# Patient Record
Sex: MALE | Race: WHITE | NOT HISPANIC OR LATINO | Employment: FULL TIME | URBAN - METROPOLITAN AREA
[De-identification: names, ages, dates, MRNs, and addresses within clinical notes are randomized per-mention and may not be internally consistent; named-entity substitution may affect disease eponyms.]

---

## 2023-04-29 ENCOUNTER — HOSPITAL ENCOUNTER (EMERGENCY)
Facility: HOSPITAL | Age: 38
Discharge: HOME | End: 2023-04-30
Attending: EMERGENCY MEDICINE

## 2023-04-29 VITALS
WEIGHT: 257.94 LBS | HEART RATE: 87 BPM | RESPIRATION RATE: 18 BRPM | DIASTOLIC BLOOD PRESSURE: 65 MMHG | OXYGEN SATURATION: 96 % | TEMPERATURE: 98.3 F | SYSTOLIC BLOOD PRESSURE: 116 MMHG

## 2023-04-29 DIAGNOSIS — R56.9 SEIZURE (HCC): Primary | ICD-10-CM

## 2023-04-29 NOTE — Clinical Note
Mirela Rock was seen and treated in our emergency department on 4/29/2023  Diagnosis:     Ashley Dill  may return to work on return date  He may return on this date: 05/02/2023         If you have any questions or concerns, please don't hesitate to call        Sejal Trent MD    ______________________________           _______________          _______________  [facility] [place]                              Date                                Time

## 2023-04-30 LAB
ANION GAP SERPL CALCULATED.3IONS-SCNC: 11 MMOL/L (ref 4–13)
ATRIAL RATE: 76 BPM
BASOPHILS # BLD AUTO: 0.06 THOUSANDS/ΜL (ref 0–0.1)
BASOPHILS NFR BLD AUTO: 1 % (ref 0–1)
BUN SERPL-MCNC: 19 MG/DL (ref 5–25)
CALCIUM SERPL-MCNC: 9.4 MG/DL (ref 8.4–10.2)
CHLORIDE SERPL-SCNC: 105 MMOL/L (ref 96–108)
CO2 SERPL-SCNC: 21 MMOL/L (ref 21–32)
CREAT SERPL-MCNC: 1.16 MG/DL (ref 0.6–1.3)
EOSINOPHIL # BLD AUTO: 0.09 THOUSAND/ΜL (ref 0–0.61)
EOSINOPHIL NFR BLD AUTO: 1 % (ref 0–6)
ERYTHROCYTE [DISTWIDTH] IN BLOOD BY AUTOMATED COUNT: 12.9 % (ref 11.6–15.1)
GFR SERPL CREATININE-BSD FRML MDRD: 78 ML/MIN/1.73SQ M
GLUCOSE SERPL-MCNC: 131 MG/DL (ref 65–140)
HCT VFR BLD AUTO: 41.3 % (ref 36.5–49.3)
HGB BLD-MCNC: 13.9 G/DL (ref 12–17)
IMM GRANULOCYTES # BLD AUTO: 0.11 THOUSAND/UL (ref 0–0.2)
IMM GRANULOCYTES NFR BLD AUTO: 1 % (ref 0–2)
LYMPHOCYTES # BLD AUTO: 2.08 THOUSANDS/ΜL (ref 0.6–4.47)
LYMPHOCYTES NFR BLD AUTO: 17 % (ref 14–44)
MAGNESIUM SERPL-MCNC: 2.2 MG/DL (ref 1.9–2.7)
MCH RBC QN AUTO: 30.8 PG (ref 26.8–34.3)
MCHC RBC AUTO-ENTMCNC: 33.7 G/DL (ref 31.4–37.4)
MCV RBC AUTO: 92 FL (ref 82–98)
MONOCYTES # BLD AUTO: 0.95 THOUSAND/ΜL (ref 0.17–1.22)
MONOCYTES NFR BLD AUTO: 8 % (ref 4–12)
NEUTROPHILS # BLD AUTO: 8.84 THOUSANDS/ΜL (ref 1.85–7.62)
NEUTS SEG NFR BLD AUTO: 72 % (ref 43–75)
NRBC BLD AUTO-RTO: 0 /100 WBCS
P AXIS: 69 DEGREES
PLATELET # BLD AUTO: 341 THOUSANDS/UL (ref 149–390)
PMV BLD AUTO: 9.1 FL (ref 8.9–12.7)
POTASSIUM SERPL-SCNC: 3.8 MMOL/L (ref 3.5–5.3)
PR INTERVAL: 148 MS
QRS AXIS: 94 DEGREES
QRSD INTERVAL: 104 MS
QT INTERVAL: 388 MS
QTC INTERVAL: 436 MS
RBC # BLD AUTO: 4.51 MILLION/UL (ref 3.88–5.62)
SODIUM SERPL-SCNC: 137 MMOL/L (ref 135–147)
T WAVE AXIS: 64 DEGREES
VENTRICULAR RATE: 76 BPM
WBC # BLD AUTO: 12.13 THOUSAND/UL (ref 4.31–10.16)

## 2023-04-30 RX ADMIN — LEVETIRACETAM 1000 MG: 100 INJECTION, SOLUTION INTRAVENOUS at 00:41

## 2023-04-30 NOTE — ED PROVIDER NOTES
History  Chief Complaint   Patient presents with    Syncope     Patient presents for syncopal episode and associated seizure that happened tonight at work  Patient fell forwards, bit his tongue, and had a seizure due to the fact that he has not taken his keppra recently  Patient states he has been working 24 hour shifts and has not had an opportunity to take his medication  HPI     Patient has a history of seizure disorder, typically takes Keppra 1000 mg twice daily  Patient states he has been working at his job since Friday morning at 0500 due to emergent calls, did not take his Keppra, suffered seizure similar to previous seizures  Patient states he has not slept since Friday morning at 0500  Denies any headache, chest pain, abdominal pain, nausea, vomiting, diarrhea  None       History reviewed  No pertinent past medical history  History reviewed  No pertinent surgical history  History reviewed  No pertinent family history  I have reviewed and agree with the history as documented  E-Cigarette/Vaping     E-Cigarette/Vaping Substances    THC Yes      Social History     Tobacco Use    Smoking status: Never    Smokeless tobacco: Never   Substance Use Topics    Alcohol use: Not Currently    Drug use: Yes     Types: Marijuana       Review of Systems   Neurological: Positive for seizures  All other systems reviewed and are negative  Physical Exam  Physical Exam  Vitals and nursing note reviewed  Constitutional:       General: He is not in acute distress  Appearance: He is well-developed  He is not diaphoretic  HENT:      Head: Normocephalic and atraumatic  Right Ear: External ear normal       Left Ear: External ear normal       Mouth/Throat:      Comments: Tongue biting  Eyes:      General:         Right eye: No discharge  Left eye: No discharge  Pupils: Pupils are equal, round, and reactive to light  Neck:      Thyroid: No thyromegaly        Trachea: No tracheal deviation  Cardiovascular:      Rate and Rhythm: Normal rate and regular rhythm  Heart sounds: No murmur heard  Pulmonary:      Effort: Pulmonary effort is normal       Breath sounds: Normal breath sounds  Abdominal:      General: Bowel sounds are normal  There is no distension  Palpations: Abdomen is soft  Tenderness: There is no abdominal tenderness  Musculoskeletal:         General: No deformity  Normal range of motion  Cervical back: Normal range of motion and neck supple  Comments: Moves all extremities spontaneously  No focal deficits  No cervical, thoracic, lumbar tenderness   Skin:     General: Skin is warm  Capillary Refill: Capillary refill takes less than 2 seconds  Neurological:      Mental Status: He is alert and oriented to person, place, and time  Cranial Nerves: No cranial nerve deficit  Motor: No abnormal muscle tone        Comments: GCS 15, alert and oriented, no focal neurologic deficits   Psychiatric:         Behavior: Behavior normal          Vital Signs  ED Triage Vitals [04/29/23 2351]   Temperature Pulse Respirations Blood Pressure SpO2   98 3 °F (36 8 °C) 87 18 116/65 96 %      Temp Source Heart Rate Source Patient Position - Orthostatic VS BP Location FiO2 (%)   Oral Monitor Lying Right arm --      Pain Score       --           Vitals:    04/29/23 2351   BP: 116/65   Pulse: 87   Patient Position - Orthostatic VS: Lying         Visual Acuity      ED Medications  Medications   levETIRAcetam (KEPPRA) 1,000 mg in sodium chloride 0 9 % 100 mL IVPB (1,000 mg Intravenous New Bag 4/30/23 0041)       Diagnostic Studies  Results Reviewed     Procedure Component Value Units Date/Time    Basic metabolic panel [[id number]] Collected: 04/30/23 0019    Lab Status: Final result Specimen: Blood from Arm, Left Updated: 04/30/23 0053     Sodium 137 mmol/L      Potassium 3 8 mmol/L      Chloride 105 mmol/L      CO2 21 mmol/L      ANION GAP 11 mmol/L BUN 19 mg/dL      Creatinine 1 16 mg/dL      Glucose 131 mg/dL      Calcium 9 4 mg/dL      eGFR 78 ml/min/1 73sq m     Narrative:      [place] guidelines for Chronic Kidney Disease (CKD):     Stage 1 with normal or high GFR (GFR > 90 mL/min/1 73 square meters)    Stage 2 Mild CKD (GFR = 60-89 mL/min/1 73 square meters)    Stage 3A Moderate CKD (GFR = 45-59 mL/min/1 73 square meters)    Stage 3B Moderate CKD (GFR = 30-44 mL/min/1 73 square meters)    Stage 4 Severe CKD (GFR = 15-29 mL/min/1 73 square meters)    Stage 5 End Stage CKD (GFR <15 mL/min/1 73 square meters)  Note: GFR calculation is accurate only with a steady state creatinine    Magnesium [[id number]]  (Normal) Collected: 04/30/23 0019    Lab Status: Final result Specimen: Blood from Arm, Left Updated: 04/30/23 0053     Magnesium 2 2 mg/dL     CBC and differential [[id number]]  (Abnormal) Collected: 04/30/23 0019    Lab Status: Final result Specimen: Blood from Arm, Left Updated: 04/30/23 0026     WBC 12 13 Thousand/uL      RBC 4 51 Million/uL      Hemoglobin 13 9 g/dL      Hematocrit 41 3 %      MCV 92 fL      MCH 30 8 pg      MCHC 33 7 g/dL      RDW 12 9 %      MPV 9 1 fL      Platelets 127 Thousands/uL      nRBC 0 /100 WBCs      Neutrophils Relative 72 %      Immat GRANS % 1 %      Lymphocytes Relative 17 %      Monocytes Relative 8 %      Eosinophils Relative 1 %      Basophils Relative 1 %      Neutrophils Absolute 8 84 Thousands/µL      Immature Grans Absolute 0 11 Thousand/uL      Lymphocytes Absolute 2 08 Thousands/µL      Monocytes Absolute 0 95 Thousand/µL      Eosinophils Absolute 0 09 Thousand/µL      Basophils Absolute 0 06 Thousands/µL     Levetiracetam level [[id number]] Collected: 04/30/23 0019    Lab Status:  In process Specimen: Blood from Arm, Left Updated: 04/30/23 0023                 No orders to display              Procedures  Procedures         ED Course  ED Course as of 04/30/23 0117   Sun Apr 30, 2023   0025 Patient alert and oriented, knows he is not able to drive for 6 months from today                                             Medical Decision Making  History of seizures, patient had breakthrough seizure, possibly related to sleep deprivation  Returned to his baseline  Nonfocal neurologic exam   Laboratory studies largely unremarkable, Keppra level ordered, not resulted, patient aware, will follow as an outpatient  Patient loaded with a dose of Keppra, observed in the emergency department, continue to be at his baseline, picked up by family, patient aware that he is unable to drive for the next 6 months or until cleared by neurologist    Seizure [facility]): acute illness or injury  Amount and/or Complexity of Data Reviewed  Independent Historian: spouse     Details: Patient typically well controlled, typically has a postictal period and is currently at his baseline per family at bedside  Labs: ordered  Details: Ordered and independently reviewed laboratory studies          Disposition  Final diagnoses:   Seizure [facility])     Time reflects when diagnosis was documented in both MDM as applicable and the Disposition within this note     Time User Action Codes Description Comment    4/30/2023  1:09 AM Shanika Shall Add [R56 9] Seizure [facility])       ED Disposition     ED Disposition   Discharge    Condition   Stable    Date/Time   Sun Apr 30, 2023  1:09 AM    Comment   [place] discharge to home/self care                 Follow-up Information     Follow up With Specialties Details Why Contact Info Additional Information    Your neurologist  Schedule an appointment as soon as possible for a visit in 2 days As needed      [facility] Emergency Medicine Go to  As needed [address] [facility] [address] [facility],  [address], [place], [place], [address]          Patient's Medications No medications on file       No discharge procedures on file      PDMP Review     None          ED Provider  Electronically Signed by           Ana Conte MD  04/30/23 2493

## 2023-05-02 LAB — LEVETIRACETAM SERPL-MCNC: 7.4 UG/ML (ref 10–40)
